# Patient Record
Sex: MALE | Race: OTHER | Employment: UNEMPLOYED | URBAN - METROPOLITAN AREA
[De-identification: names, ages, dates, MRNs, and addresses within clinical notes are randomized per-mention and may not be internally consistent; named-entity substitution may affect disease eponyms.]

---

## 2022-11-15 ENCOUNTER — HOSPITAL ENCOUNTER (EMERGENCY)
Age: 42
Discharge: HOME | End: 2022-11-15
Attending: EMERGENCY MEDICINE

## 2022-11-15 ENCOUNTER — APPOINTMENT (OUTPATIENT)
Dept: GENERAL RADIOLOGY | Age: 42
End: 2022-11-15

## 2022-11-15 VITALS
WEIGHT: 130 LBS | TEMPERATURE: 97.6 F | BODY MASS INDEX: 20.4 KG/M2 | OXYGEN SATURATION: 93 % | DIASTOLIC BLOOD PRESSURE: 77 MMHG | SYSTOLIC BLOOD PRESSURE: 123 MMHG | RESPIRATION RATE: 20 BRPM | HEART RATE: 95 BPM | HEIGHT: 67 IN

## 2022-11-15 DIAGNOSIS — J45.901 MODERATE ACUTE EXACERBATION OF ASTHMA: Primary | ICD-10-CM

## 2022-11-15 LAB
ALBUMIN SERPL-MCNC: 4.5 G/DL (ref 3.5–5.2)
ALP BLD-CCNC: 79 U/L (ref 40–129)
ALT SERPL-CCNC: 15 U/L (ref 0–40)
ANION GAP SERPL CALCULATED.3IONS-SCNC: 13 MMOL/L (ref 7–16)
AST SERPL-CCNC: 17 U/L (ref 0–39)
BASOPHILS ABSOLUTE: 0.05 E9/L (ref 0–0.2)
BASOPHILS RELATIVE PERCENT: 0.6 % (ref 0–2)
BILIRUB SERPL-MCNC: 0.4 MG/DL (ref 0–1.2)
BUN BLDV-MCNC: 12 MG/DL (ref 6–20)
CALCIUM SERPL-MCNC: 9.8 MG/DL (ref 8.6–10.2)
CHLORIDE BLD-SCNC: 108 MMOL/L (ref 98–107)
CO2: 24 MMOL/L (ref 22–29)
CREAT SERPL-MCNC: 0.7 MG/DL (ref 0.7–1.2)
D DIMER: <200 NG/ML DDU
EKG ATRIAL RATE: 80 BPM
EKG P AXIS: 82 DEGREES
EKG P-R INTERVAL: 176 MS
EKG Q-T INTERVAL: 376 MS
EKG QRS DURATION: 88 MS
EKG QTC CALCULATION (BAZETT): 433 MS
EKG R AXIS: 75 DEGREES
EKG T AXIS: 44 DEGREES
EKG VENTRICULAR RATE: 80 BPM
EOSINOPHILS ABSOLUTE: 1.76 E9/L (ref 0.05–0.5)
EOSINOPHILS RELATIVE PERCENT: 21.5 % (ref 0–6)
GFR SERPL CREATININE-BSD FRML MDRD: >60 ML/MIN/1.73
GLUCOSE BLD-MCNC: 71 MG/DL (ref 74–99)
HCT VFR BLD CALC: 50.5 % (ref 37–54)
HEMOGLOBIN: 16.9 G/DL (ref 12.5–16.5)
IMMATURE GRANULOCYTES #: 0.01 E9/L
IMMATURE GRANULOCYTES %: 0.1 % (ref 0–5)
LYMPHOCYTES ABSOLUTE: 2 E9/L (ref 1.5–4)
LYMPHOCYTES RELATIVE PERCENT: 24.4 % (ref 20–42)
MAGNESIUM: 2.1 MG/DL (ref 1.6–2.6)
MCH RBC QN AUTO: 31.4 PG (ref 26–35)
MCHC RBC AUTO-ENTMCNC: 33.5 % (ref 32–34.5)
MCV RBC AUTO: 93.7 FL (ref 80–99.9)
MONOCYTES ABSOLUTE: 0.38 E9/L (ref 0.1–0.95)
MONOCYTES RELATIVE PERCENT: 4.6 % (ref 2–12)
NEUTROPHILS ABSOLUTE: 3.99 E9/L (ref 1.8–7.3)
NEUTROPHILS RELATIVE PERCENT: 48.8 % (ref 43–80)
PDW BLD-RTO: 12.4 FL (ref 11.5–15)
PLATELET # BLD: 288 E9/L (ref 130–450)
PMV BLD AUTO: 11.5 FL (ref 7–12)
POTASSIUM SERPL-SCNC: 4.2 MMOL/L (ref 3.5–5)
PRO-BNP: 12 PG/ML (ref 0–125)
RBC # BLD: 5.39 E12/L (ref 3.8–5.8)
SODIUM BLD-SCNC: 145 MMOL/L (ref 132–146)
TOTAL PROTEIN: 7.7 G/DL (ref 6.4–8.3)
TROPONIN, HIGH SENSITIVITY: <6 NG/L (ref 0–11)
WBC # BLD: 8.2 E9/L (ref 4.5–11.5)

## 2022-11-15 PROCEDURE — 6360000002 HC RX W HCPCS

## 2022-11-15 PROCEDURE — 6370000000 HC RX 637 (ALT 250 FOR IP)

## 2022-11-15 PROCEDURE — 93005 ELECTROCARDIOGRAM TRACING: CPT

## 2022-11-15 PROCEDURE — 36415 COLL VENOUS BLD VENIPUNCTURE: CPT

## 2022-11-15 PROCEDURE — 83735 ASSAY OF MAGNESIUM: CPT

## 2022-11-15 PROCEDURE — 96365 THER/PROPH/DIAG IV INF INIT: CPT

## 2022-11-15 PROCEDURE — 80053 COMPREHEN METABOLIC PANEL: CPT

## 2022-11-15 PROCEDURE — 85378 FIBRIN DEGRADE SEMIQUANT: CPT

## 2022-11-15 PROCEDURE — 84484 ASSAY OF TROPONIN QUANT: CPT

## 2022-11-15 PROCEDURE — 71045 X-RAY EXAM CHEST 1 VIEW: CPT

## 2022-11-15 PROCEDURE — 99285 EMERGENCY DEPT VISIT HI MDM: CPT

## 2022-11-15 PROCEDURE — 85025 COMPLETE CBC W/AUTO DIFF WBC: CPT

## 2022-11-15 PROCEDURE — 83880 ASSAY OF NATRIURETIC PEPTIDE: CPT

## 2022-11-15 PROCEDURE — 94640 AIRWAY INHALATION TREATMENT: CPT

## 2022-11-15 PROCEDURE — 96366 THER/PROPH/DIAG IV INF ADDON: CPT

## 2022-11-15 RX ORDER — IPRATROPIUM BROMIDE AND ALBUTEROL SULFATE 2.5; .5 MG/3ML; MG/3ML
3 SOLUTION RESPIRATORY (INHALATION)
Status: COMPLETED | OUTPATIENT
Start: 2022-11-15 | End: 2022-11-15

## 2022-11-15 RX ORDER — IPRATROPIUM BROMIDE AND ALBUTEROL SULFATE 2.5; .5 MG/3ML; MG/3ML
1 SOLUTION RESPIRATORY (INHALATION) EVERY 4 HOURS
Qty: 360 ML | Refills: 0 | Status: SHIPPED | OUTPATIENT
Start: 2022-11-15

## 2022-11-15 RX ORDER — PREDNISONE 20 MG/1
40 TABLET ORAL DAILY
Qty: 10 TABLET | Refills: 0 | Status: SHIPPED | OUTPATIENT
Start: 2022-11-15 | End: 2022-11-20

## 2022-11-15 RX ORDER — MAGNESIUM SULFATE IN WATER 40 MG/ML
2000 INJECTION, SOLUTION INTRAVENOUS ONCE
Status: COMPLETED | OUTPATIENT
Start: 2022-11-15 | End: 2022-11-15

## 2022-11-15 RX ORDER — ALBUTEROL SULFATE 90 UG/1
2 AEROSOL, METERED RESPIRATORY (INHALATION) EVERY 6 HOURS PRN
COMMUNITY
End: 2022-11-15

## 2022-11-15 RX ORDER — PREDNISONE 20 MG/1
40 TABLET ORAL
Status: DISCONTINUED | OUTPATIENT
Start: 2022-11-15 | End: 2022-11-15 | Stop reason: HOSPADM

## 2022-11-15 RX ORDER — NEBULIZER ACCESSORIES
1 KIT MISCELLANEOUS DAILY PRN
Qty: 1 KIT | Refills: 0 | Status: SHIPPED | OUTPATIENT
Start: 2022-11-15

## 2022-11-15 RX ORDER — PREDNISONE 20 MG/1
40 TABLET ORAL
Status: DISCONTINUED | OUTPATIENT
Start: 2022-11-15 | End: 2022-11-15 | Stop reason: SDUPTHER

## 2022-11-15 RX ORDER — ALBUTEROL SULFATE 90 UG/1
2 AEROSOL, METERED RESPIRATORY (INHALATION) 4 TIMES DAILY PRN
Qty: 18 G | Refills: 0 | Status: SHIPPED | OUTPATIENT
Start: 2022-11-15

## 2022-11-15 RX ADMIN — PREDNISONE 40 MG: 20 TABLET ORAL at 10:26

## 2022-11-15 RX ADMIN — MAGNESIUM SULFATE HEPTAHYDRATE 2000 MG: 40 INJECTION, SOLUTION INTRAVENOUS at 09:20

## 2022-11-15 RX ADMIN — IPRATROPIUM BROMIDE AND ALBUTEROL SULFATE 3 AMPULE: .5; 3 SOLUTION RESPIRATORY (INHALATION) at 08:20

## 2022-11-15 ASSESSMENT — PAIN SCALES - WONG BAKER: WONGBAKER_NUMERICALRESPONSE: 4

## 2022-11-15 ASSESSMENT — PAIN DESCRIPTION - LOCATION: LOCATION: CHEST

## 2022-11-15 ASSESSMENT — ENCOUNTER SYMPTOMS
WHEEZING: 1
SINUS PRESSURE: 0
DIARRHEA: 0
BLOOD IN STOOL: 0
RHINORRHEA: 0
ABDOMINAL DISTENTION: 0
VOMITING: 0
ABDOMINAL PAIN: 0
SORE THROAT: 0
COUGH: 1
NAUSEA: 0
CONSTIPATION: 0
SINUS PAIN: 0
SHORTNESS OF BREATH: 1

## 2022-11-15 ASSESSMENT — PAIN DESCRIPTION - PAIN TYPE: TYPE: ACUTE PAIN

## 2022-11-15 ASSESSMENT — PAIN DESCRIPTION - ORIENTATION: ORIENTATION: MID

## 2022-11-15 ASSESSMENT — PAIN - FUNCTIONAL ASSESSMENT: PAIN_FUNCTIONAL_ASSESSMENT: WONG-BAKER FACES

## 2022-11-15 NOTE — ED NOTES
Wife his at bedside and is on the phone with a relative. The relative stated that the wife prefers to use her as a .  The relative is stating that the patient states that he is feeling      Annita Kate RN  11/15/22 [id number]

## 2022-11-15 NOTE — ED NOTES
Pt's pulse ox while ambulating is 96%. Pt states that he still feels a little bit short of breath. Cough is still present.       Kate, RN  11/15/22 1400

## 2022-11-15 NOTE — ED PROVIDER NOTES
Sharonda Jacques is a 38 y.o. male    Chief Complaint   Patient presents with    Cough     15 days ago, 89% on RA, 2 L NC applied and SPO2 94%    Shortness of Breath    Medication Refill     Needs refill of Ventolin       HPI   Sharonda Jacques is a 38 y.o. male presenting to the ED for Cough (15 days ago, 89% on RA, 2 L NC applied and SPO2 94%), Shortness of Breath, and Medication Refill (Needs refill of Ventolin)    History comes primarily from the patient and Family. Patient is accompanied by his wife. They are from [facility], they speak Mohawk and they need an . Patient has a past medical history of seasonal asthma for what he takes and inhaler and oral medication for what he does not remember the name. Patient comes to the emergency department for shortness of breath and productive cough. Symptoms are persistent and started 1 day ago. Patient has had episodes like this in the past and he was diagnosed with asthma exacerbation. He is currently wearing oxygen. Patient does not use oxygen at baseline. Cough is productive with white sputum. Associated with chest discomfort, worse with breathing. Denies fever, dizziness, nausea, vomiting, abdominal pain, diarrhea and urinary symptoms. Denies sick contacts. Patient has risk factors for pulmonary embolism. Review of Systems   Constitutional:  Negative for chills, fatigue and fever. HENT:  Negative for congestion, ear discharge, ear pain, rhinorrhea, sinus pressure, sinus pain, sneezing and sore throat. Eyes:  Negative for visual disturbance. Respiratory:  Positive for cough, shortness of breath and wheezing. Cardiovascular:  Positive for chest pain (Discomfort, worse with breathing). Negative for palpitations. Gastrointestinal:  Negative for abdominal distention, abdominal pain, blood in stool, constipation, diarrhea, nausea and vomiting.    Genitourinary:  Negative for difficulty urinating, dysuria, flank pain, frequency and hematuria. Musculoskeletal:  Negative for myalgias. Skin:  Negative for rash. Neurological:  Negative for dizziness, tremors, seizures, syncope, light-headedness, numbness and headaches. Psychiatric/Behavioral:  Negative for confusion. The patient is not nervous/anxious. Physical Exam  Vitals reviewed. Constitutional:       General: He is not in acute distress. HENT:      Head: Normocephalic and atraumatic. Mouth/Throat:      Mouth: Mucous membranes are moist.      Pharynx: Oropharynx is clear. Eyes:      Extraocular Movements: Extraocular movements intact. Pupils: Pupils are equal, round, and reactive to light. Cardiovascular:      Rate and Rhythm: Normal rate and regular rhythm. Pulmonary:      Effort: Tachypnea present. Breath sounds: Wheezing present. Comments: Increased work of breathing  Currently on 2 L of oxygen  Abdominal:      General: Bowel sounds are normal.      Palpations: Abdomen is soft. Tenderness: There is no abdominal tenderness. There is no guarding or rebound. Musculoskeletal:         General: Normal range of motion. Cervical back: Normal range of motion and neck supple. Right lower leg: No edema. Left lower leg: No edema. Skin:     General: Skin is warm and dry. Capillary Refill: Capillary refill takes less than 2 seconds. Neurological:      General: No focal deficit present. Mental Status: He is alert and oriented to person, place, and time. Psychiatric:         Mood and Affect: Mood normal.         Behavior: Behavior normal.        EKG: This EKG is signed and interpreted by me.     Rate: 80 bpm  Rhythm: Normal sinus rhythm  Axis: normal  Interpretation: Normal sinus rhythm with normal axes, good R wave progression and negative for acute ischemic changes  Comparison: no previous EKG available      MDM  Patient presented to the Emergency Department for Cough (15 days ago, 89% on RA, 2 L NC applied and SPO2 94%), Shortness of Breath, and Medication Refill (Needs refill of Ventolin)    Patient is a 61-year-old male with a past medical history of seasonal asthma who comes to the emergency department for shortness of breath and productive cough with whitish sputum. Patient is hypoxic and tachypneic at presentation, rest of vitals within normal limits. Currently on 2 L of oxygen with a saturation of oxygen over 95%. Physical exam is remarkable for increased work of breathing and diffuse bilateral wheezing. Differential diagnosis includes asthma exacerbation versus respiratory infection versus heart failure versus pulmonary embolism. DuoNeb x3, prednisone 40 mg and magnesium sulfate 2 g ordered for acute asthma exacerbation. CBC remarkable for mild erythrocytosis. CMP and electrolytes unremarkable. proBNP within normal limits. Troponin and D-dimer negative. Chest x-ray negative for an acute process. Patient's symptoms have improved after treatment. Patient is currently on room air. Pulse ox with ambulation done and patient saturating more than 95%. All vital signs are within normal limits. Patient is hemodynamically stable. Patient will be discharged home with albuterol, DuoNeb, prednisone and a respiratory therapy device. Patient needs close follow-up with PCP. Patient agreed with plan as above. All questions answered. Coming back to the emergency department if worsening highly recommended. Diagnosis: Acute asthma exacerbation      --------------------------------------------- PAST HISTORY ---------------------------------------------  Past Medical History:  has a past medical history of Asthma. Past Surgical History:  has no past surgical history on file. Social History:  reports that he has never smoked. He has never used smokeless tobacco. He reports that he does not drink alcohol and does not use drugs. Family History: family history is not on file.      The patients home medications have been reviewed. Allergies: Patient has no known allergies.     -------------------------------------------------- RESULTS -------------------------------------------------  Labs:  Results for orders placed or performed during the hospital encounter of 11/15/22   CBC with Auto Differential   Result Value Ref Range    WBC 8.2 4.5 - 11.5 E9/L    RBC 5.39 3.80 - 5.80 E12/L    Hemoglobin 16.9 (H) 12.5 - 16.5 g/dL    Hematocrit 50.5 37.0 - 54.0 %    MCV 93.7 80.0 - 99.9 fL    MCH 31.4 26.0 - 35.0 pg    MCHC 33.5 32.0 - 34.5 %    RDW 12.4 11.5 - 15.0 fL    Platelets 865 634 - 841 E9/L    MPV 11.5 7.0 - 12.0 fL    Neutrophils % 48.8 43.0 - 80.0 %    Immature Granulocytes % 0.1 0.0 - 5.0 %    Lymphocytes % 24.4 20.0 - 42.0 %    Monocytes % 4.6 2.0 - 12.0 %    Eosinophils % 21.5 (H) 0.0 - 6.0 %    Basophils % 0.6 0.0 - 2.0 %    Neutrophils Absolute 3.99 1.80 - 7.30 E9/L    Immature Granulocytes # 0.01 E9/L    Lymphocytes Absolute 2.00 1.50 - 4.00 E9/L    Monocytes Absolute 0.38 0.10 - 0.95 E9/L    Eosinophils Absolute 1.76 (H) 0.05 - 0.50 E9/L    Basophils Absolute 0.05 0.00 - 0.20 E9/L   Comprehensive Metabolic Panel   Result Value Ref Range    Sodium 145 132 - 146 mmol/L    Potassium 4.2 3.5 - 5.0 mmol/L    Chloride 108 (H) 98 - 107 mmol/L    CO2 24 22 - 29 mmol/L    Anion Gap 13 7 - 16 mmol/L    Glucose 71 (L) 74 - 99 mg/dL    BUN 12 6 - 20 mg/dL    Creatinine 0.7 0.7 - 1.2 mg/dL    Est, Glom Filt Rate >60 >=60 mL/min/1.73    Calcium 9.8 8.6 - 10.2 mg/dL    Total Protein 7.7 6.4 - 8.3 g/dL    Albumin 4.5 3.5 - 5.2 g/dL    Total Bilirubin 0.4 0.0 - 1.2 mg/dL    Alkaline Phosphatase 79 40 - 129 U/L    ALT 15 0 - 40 U/L    AST 17 0 - 39 U/L   Magnesium   Result Value Ref Range    Magnesium 2.1 1.6 - 2.6 mg/dL   Brain Natriuretic Peptide   Result Value Ref Range    Pro-BNP 12 0 - 125 pg/mL   Troponin   Result Value Ref Range    Troponin, High Sensitivity <6 0 - 11 ng/L   D-Dimer, Quantitative   Result Value Ref Range D-Dimer, Quant <200 ng/mL DDU   EKG 12 Lead   Result Value Ref Range    Ventricular Rate 80 BPM    Atrial Rate 80 BPM    P-R Interval 176 ms    QRS Duration 88 ms    Q-T Interval 376 ms    QTc Calculation (Bazett) 433 ms    P Axis 82 degrees    R Axis 75 degrees    T Axis 44 degrees       Radiology:  XR CHEST PORTABLE   Final Result   No acute cardiopulmonary process. ------------------------- NURSING NOTES AND VITALS REVIEWED ---------------------------  Date / Time Roomed:  11/15/2022  7:31 AM  ED Bed Assignment:  10/10    The nursing notes within the ED encounter and vital signs as below have been reviewed. /77   Pulse 95   Temp 97.6 °F (36.4 °C)   Resp 20   Ht 5' 7\" (1.702 m)   Wt 130 lb (59 kg)   SpO2 93%   BMI 20.36 kg/m²       ------------------------------------------ PROGRESS NOTES ------------------------------------------  2:52 PM EST  I have spoken with the patient and discussed todays results, in addition to providing specific details for the plan of care and counseling regarding the diagnosis and prognosis. Their questions are answered at this time and they are agreeable with the plan. I discussed at length with them reasons for immediate return here for re evaluation. They will followup with their primary care physician by calling their office tomorrow. --------------------------------- ADDITIONAL PROVIDER NOTES ---------------------------------  At this time the patient is without objective evidence of an acute process requiring hospitalization or inpatient management. They have remained hemodynamically stable throughout their entire ED visit and are stable for discharge with outpatient follow-up. The plan has been discussed in detail and they are aware of the specific conditions for emergent return, as well as the importance of follow-up.       New Prescriptions    ALBUTEROL SULFATE HFA (VENTOLIN HFA) 108 (90 BASE) MCG/ACT INHALER    Inhale 2 puffs into the lungs 4 times daily as needed for Wheezing    IPRATROPIUM-ALBUTEROL (DUONEB) 0.5-2.5 (3) MG/3ML SOLN NEBULIZER SOLUTION    Inhale 3 mLs into the lungs every 4 hours    PREDNISONE (DELTASONE) 20 MG TABLET    Take 2 tablets by mouth daily for 5 days    RESPIRATORY THERAPY SUPPLIES (NEBULIZER/TUBING/MOUTHPIECE) KIT    1 kit by Does not apply route daily as needed (wheezing)       Diagnosis:  1. Moderate acute exacerbation of asthma        Disposition:  Patient's disposition: Discharge to home  Patient's condition is stable.     Luci Burkitt, MD Luci Burkitt, MD  Resident  11/15/22 [id number]

## 2022-11-15 NOTE — ED NOTES
When Benito rogers completed I took off O@ and SPO2 started dropping at 92% placed pt back on [address] [facility], [address] [place] [address]  11/15/22 1928

## 2022-11-15 NOTE — ED NOTES
Patient placed on Room air.  o2 sat 94% but patient is requesting Oxygen for comfort and states that he is feeling short of breath when he doesn't wear O2     Isaiah Salomon RN  11/15/22 5077